# Patient Record
(demographics unavailable — no encounter records)

---

## 2025-04-28 NOTE — REVIEW OF SYSTEMS
[Nocturia] : nocturia [SOB on Exertion] : no sob on exertion [TextBox_30] : Improved cough with mild whitish sputum [TextBox_69] : GERD symptoms improved on PPI

## 2025-04-28 NOTE — HISTORY OF PRESENT ILLNESS
[TextBox_4] : 72 year old Female with TIA, mild INO and chronic cough who presents for follow up.  Patient presents with a persistent cough that started last month following a suspected flu episode. Patient reports the cough has been severe and unrelenting, significantly impacting her quality of life.  The patient states her cough began after experiencing flu-like symptoms with fever last month. She initially delayed seeking medical attention due to concerns about potential hospitalization. The cough is described as severe, causing a "hollow" feeling in her throat. It worsens after eating or drinking and is occasionally accompanied by phlegm, which she describes as "foamy and white." The cough is so severe that it causes urinary incontinence, leading her to wear diapers when going out. It also interferes with her ability to enjoy activities and vacations.  Patient reports some alleviation of symptoms with asthma treatments and inhalers, but the relief is short-lived, lasting only 1-2 hours. She has completed a course of steroids, which she reports didn't provide significant relief and caused "an awful feeling." She also mentions taking an antibiotic, though she cannot recall the specific antibiotic prescribed.  The patient's sleep is generally unaffected, with occasional nighttime awakenings for asthma treatments. She notes that her cough is typically absent upon waking but starts after performing a nasal rinse or consuming food or drink. To manage nighttime symptoms, she props herself up with three pillows while sleeping.  Patient has a history of asthma and acid reflux. She expresses frustration with previous diagnoses, including a suggestion of COPD, which she disputes due to her non-smoking status. She also mentions a history of skin discoloration that has been difficult to diagnose and treat.  The patient reports adherence to her prescribed medications, including use of inhalers and nebulizers. She expresses willingness to try new treatments to manage her persistent cough.   Of note: She was last seen 5/6/2024 and reported chronic uncontrolled cough. A CT was performed 5/14/24 and showed no acute pulmonary abnormalities to account for cough. 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  Amitriptyline was ordered to help with nocturnal cough suppression. She was then referred to ENT for further non-pulmonary cough workup.  She saw Dr. Lopez who performed nasal endoscopy which was unremarkable. CT of sinus was unremarkable. She states that since starting amitriptyline her cough has significantly improved. She still occasionally coughs up whitish mucous   She denies nocturnal awakenings.   She states that although she sometimes wakes up tired she feels as if it is because she is not sleeping early enough. She would only like to address her INO after she retires next year.   Last Diagnostic Sleep Study    Date (MM/YY): 05/2023   Apnea Hypopnea Index (AHI), per hour: 9.2   T90%: 3.2   Octavio desaturation%: 83   [DIS] : does not have difficulty initiating sleep [DMS] : does not have difficulty maintaining sleep

## 2025-04-28 NOTE — ASSESSMENT
[FreeTextEntry1] : 70YO Female with TIA, mild INO and neurogenic cough who presents for follow up.  Assessment: 1. Chronic cough - Patient reports a persistent cough since last month, initially associated with flu-like symptoms. The cough has not improved despite completing a course of antibiotics and steroids. Previous workup, including chest scans and pulmonary function tests, has been normal. CAT scans of sinuses were also normal. The cough worsens after eating or drinking, suggesting possible gastroesophageal reflux disease (GERD) as a contributing factor. Previous inspection of the throat by Dr. Lopez revealed signs of acid reflux impact. The patient's improvement with amitriptyline suggests a possible neurogenic cough. Differential diagnoses include GERD-induced cough, neurogenic cough, and less likely, relapsing polychondritis (as suggested by the patient), and tracheomalacia.  2. Asthma - Patient has a history of asthma and uses albuterol inhaler with some relief. She reports using a nebulizer for asthma treatments, which provides temporary relief for 1-2 hours.  3. Mild INO - HST 5/2023 AHI 9.2, T90%: 3.2, Octavio desaturation%: 83. - She states that although she sometimes wakes up tired she feels as if it is because she is not sleeping early enough  - She would only like to address her INO after she retires next year  Plan: - Increase amitriptyline to 1 full tablet daily (previously half tablet) - Prescribe cough suppressant - Prescribe budesonide nebulizer solution twice daily for one month - Refer to interventional pulmonology for bronchoscopy evaluation - Refer to gastroenterology specialist for evaluation of severe acid reflux - Continue albuterol inhaler as needed - Defer blood work due to recent steroid use; reassess need for labs in a few weeks  Follow-up: Follow up in May to reassess symptoms and review specialist findings. Reassess efficacy of new treatment regimen for asthma at follow-up.

## 2025-05-22 NOTE — HISTORY OF PRESENT ILLNESS
[Never] : never [TextBox_4] : Interventional Pulmonology Consultation Note First Visit with IP: May 22 2025  2:00PM   Ms. VALENTINO is a 72-year-old with PMH of asthma, TIA, INO, and chronic cough. She was referred to interventional pulmonology by Dr. Do for a possible bronchoscopy due to her ongoing chronic cough.  Per records patient has been under Dr. Do's care for an extended period and is currently being treated for her persistent cough, which has significantly affected her quality of life. Initially her symptoms manifested as the flu around 05/06/2024 and has since then been ongoing. She has experienced some symptom relief with her asthma treatments and inhalers, but this relief has been temporary. Additionally, she completed a full course of steroids without significant improvement. According to the records, her symptoms typically occur in the morning upon waking but do not interfere with her sleep.    Patient most recent CT scan of the Chest done on 05/30/2024 at  was notable for the following:  -No acute pleural or parenchymal abnormality.  Ms. VALENTINO past medical history is notable for: Anticoagulation: [ ] Emphysema: [ ] Personal History of Lung Cancer: [ ] Family History of Lung Cancer: [ ] Previous imaging or biopsy:  [ ] History of Fungal or Mycobacterial Infections:  [ ] History of Autoimmune Conditions: [ ] Smoking history of []   Today She is [ ]

## 2025-05-22 NOTE — ASSESSMENT
[FreeTextEntry1] : Ms. VALENTINO is a 72-year-old with PMH of asthma, TIA, INO, and chronic cough. She was referred to interventional pulmonology by Dr. Do for a possible bronchoscopy due to her ongoing chronic cough that began in May 2024. This cough initially presented with flu-like symptoms and has persisted since then. She finds some relief when using her asthma treatments, such as nebulizers and inhalers, although the relief is usually short-lived. Additionally, she has noticed that her symptoms tend to occur in the morning vs the night time.  After further review of the provided diagnostic the best procedural option at this time is to do a awake dynamic CT and plan for a Bronchoscopy and BAL. - Orders were implemented IP Booking and CBC, CMP, INR and APTT - We also discuss getting medical clearance from PCP.

## 2025-05-22 NOTE — DISCUSSION/SUMMARY
[FreeTextEntry1] : The patients most recent CT scan was reviewed by my independently and my interpretation is stated below: -No acute pleural or parenchymal abnormality.

## 2025-06-19 NOTE — HISTORY OF PRESENT ILLNESS
[TextBox_4] : This is a 72-year-old Female with TIA, mild INO and chronic cough who presents for follow up.  Patient reports that the bronchoscopy experience was "okay," and she received a follow-up call the next day to check for pain or bleeding. Three days post-procedure, she developed pain on the right side of her chest, which has been gradually improving without medical intervention.  The patient's cough has significantly improved since her last visit, though she still experiences coughing episodes when laughing and produces foamy, clear mucus. She notes that her cough was typically worse in the mornings prior to the bronchoscopy. The patient continues to take amitriptyline at night, which she believes is beneficial for her condition. She reports avoiding smoke and dust due to their negative effects on her respiratory system.  Regarding her medical history, patient mentions having upper respiratory problems since her early 40s and a family history of asthma and heart disease in her father. She missed a scheduled appointment with a gastroenterologist due to navigation issues but has not yet rescheduled.  Of note: 04/2025 The patient has been experiencing a severe and persistent cough since last month after flu-like symptoms, significantly impacting her quality of life and causing issues such as urinary incontinence. While asthma treatments provide brief relief, her cough exacerbates after eating or drinking and is occasionally accompanied by foamy white phlegm. Despite a history of asthma and acid reflux, she disputes a COPD diagnosis due to never smoking, and she deals with skin discoloration challenges, remaining open to trying new treatments to manage her symptoms effectively.  Of note: She was last seen 5/6/2024 and reported chronic uncontrolled cough. A CT was performed 5/14/24 and showed no acute pulmonary abnormalities to account for cough. 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  Amitriptyline was ordered to help with nocturnal cough suppression. She was then referred to ENT for further non-pulmonary cough workup.  She saw Dr. Lopez who performed nasal endoscopy which was unremarkable. CT of sinus was unremarkable. She states that since starting amitriptyline her cough has significantly improved. She still occasionally coughs up whitish mucous   She denies nocturnal awakenings.   She states that although she sometimes wakes up tired she feels as if it is because she is not sleeping early enough. She would only like to address her INO after she retires next year.   Last Diagnostic Sleep Study    Date (MM/YY): 05/2023   Apnea Hypopnea Index (AHI), per hour: 9.2   T90%: 3.2   Octavio desaturation%: 83   [DIS] : does not have difficulty initiating sleep [DMS] : does not have difficulty maintaining sleep

## 2025-06-19 NOTE — ASSESSMENT
[FreeTextEntry1] : This is a 72-year-old Female with TIA, mild INO and chronic cough who presents for follow up.  Assessment: 1. Tracheobronchomalacia - Patient was diagnosed with tracheobronchomalacia based on recent bronchoscopy results. This condition explains her frequent coughing, particularly worse in the mornings. The patient reports improvement in cough frequency since bronchoscopy and with the use of Amitriptyline but still experiences coughing with laughter and produces foamy, clear mucus.   2. Gastroesophageal reflux disease (GERD) - Patient has a history of GERD, which is being considered as a potential contributor to her chronic cough. The clinician reports personal improvement in symptoms by taking acid reflux medication before meals.  3. Mild INO - HST 5/2023 AHI 9.2, T90%: 3.2, Octavio desaturation%: 83. - She states that although she sometimes wakes up tired she feels as if it is because she is not sleeping early enough  - She may benefit from CPAP given her TBM.  Plan: - Schedule home study with low-pressure CPAP to evaluate its effectiveness in reducing morning cough - Continue amitriptyline at night - Reschedule missed appointment with GI doctor for further evaluation of cough etiology - Continue current GERD medication regimen (specific medication and dosage not mentioned) - Await rescheduled GI consultation for further evaluation and management  Follow up: Patient will follow up with me in 2-3 months.

## 2025-07-29 NOTE — HISTORY OF PRESENT ILLNESS
[FreeTextEntry1] : Ms. Sharee Medina is a 71 yo woman with chronic cough, INO and TIA who presents to discuss possible GERD/ contribution of GERD to cough.  She has actually had resolution of her cough on amitriptyline 10 mg qHS. Has been on omeprazole 20 daily for >5 years for symptoms of epigastric burning and chest burning. These symptoms are controlled on this dose of medication but if she forgets to take it will have symptoms. No regurgitation, no dysphagia, no nausea or vomiting, no abdominal pain, no weight loss. Cough persisted while on PPI, though she did not dose escalate.  Since Saturday has had a coming and going R flank pain - not worse with eating, no change with bowel movements. Not having it right now. Has regular bowel movements 2-3 times a day and this has not changed recently. No bloody or black bowel movements.   Gets colonoscopies with [facility] and states she is not due.

## 2025-07-29 NOTE — ASSESSMENT
[FreeTextEntry1] : 72F chronic cough, INO and TIA who presents to discuss possible GERD/ contribution of GERD to cough.  #Chronic cough #Heartburn - cough completely resolved with amitriptyline and not responsive to PPI at all (although granted very low dose), low suspicion that GERD is contributing to cough - does have chronic GERD, symptoms controlled with omeprazole 20 mg daily - discussed that there are two options to objectively assess reflux burden and symptom association with reflux, namely catheter-based 24h pH-impedance testing (which is done without sedation but which requires a manometry beforehand to determine catheter placement), or 96h wireless esophageal pH testing with [employer] (which requires placement of pH telemetry chip during sedated endoscopy) - she is not interested in pH testing at this time as her cough is resolved and her GERD symptoms are controlled and she would not be interested in anti-reflux surgery; can consider decreasing antisecretory therapy to H2RA down the line  #RUQ pain - very mildly elevated AST/ALT (40/41) in 5/2025; given BMI likely 2/2 fatty liver, does not explain pain - RUQUS ordered

## 2025-07-29 NOTE — HISTORY OF PRESENT ILLNESS
[FreeTextEntry1] : Ms. Sharee Medina is a 71 yo woman with chronic cough, INO and TIA who presents to discuss possible GERD/ contribution of GERD to cough.  She has actually had resolution of her cough on amitriptyline 10 mg qHS. Has been on omeprazole 20 daily for >5 years for symptoms of epigastric burning and chest burning. These symptoms are controlled on this dose of medication but if she forgets to take it will have symptoms. No regurgitation, no dysphagia, no nausea or vomiting, no abdominal pain, no weight loss. Cough persisted while on PPI, though she did not dose escalate.  Since Saturday has had a coming and going R flank pain - not worse with eating, no change with bowel movements. Not having it right now. Has regular bowel movements 2-3 times a day and this has not changed recently. No bloody or black bowel movements.   Gets colonoscopies with [facility] and states she is not due.

## 2025-07-29 NOTE — HISTORY OF PRESENT ILLNESS
[FreeTextEntry1] : Ms. Sharee Medina is a 71 yo woman with chronic cough, INO and TIA who presents to discuss possible GERD/ contribution of GERD to cough.  She has actually had resolution of her cough on amitriptyline 10 mg qHS. Has been on omeprazole 20 daily for >5 years for symptoms of epigastric burning and chest burning. These symptoms are controlled on this dose of medication but if she forgets to take it will have symptoms. No regurgitation, no dysphagia, no nausea or vomiting, no abdominal pain, no weight loss. Cough persisted while on PPI, though she did not dose escalate.  Since Saturday has had a coming and going R flank pain - not worse with eating, no change with bowel movements. Not having it right now. Has regular bowel movements 2-3 times a day and this has not changed recently. No bloody or black bowel movements.   Gets colonoscopies with [facility] and states she is not due.

## 2025-07-29 NOTE — PHYSICAL EXAM
[No Acute Distress] : no acute distress [Sclera] : the sclera and conjunctiva were normal [No Respiratory Distress] : no respiratory distress [Heart Rate And Rhythm] : heart rate was normal and rhythm regular [Abdomen Tenderness] : non-tender [Abdomen Soft] : soft [Oriented To Time, Place, And Person] : oriented to person, place, and time